# Patient Record
Sex: FEMALE | Race: WHITE
[De-identification: names, ages, dates, MRNs, and addresses within clinical notes are randomized per-mention and may not be internally consistent; named-entity substitution may affect disease eponyms.]

---

## 2017-01-31 ENCOUNTER — HOSPITAL ENCOUNTER (OUTPATIENT)
Age: 82
Discharge: HOME | End: 2017-01-31
Payer: MEDICARE

## 2017-01-31 DIAGNOSIS — R73.01: ICD-10-CM

## 2017-01-31 DIAGNOSIS — S52.532A: Primary | ICD-10-CM

## 2017-01-31 DIAGNOSIS — I10: ICD-10-CM

## 2017-01-31 DIAGNOSIS — M81.0: ICD-10-CM

## 2017-01-31 PROCEDURE — 25609 OPTX DST RD XART FX/EP SEP3+: CPT

## 2017-01-31 PROCEDURE — 0PSJ04Z REPOSITION LEFT RADIUS WITH INTERNAL FIXATION DEVICE, OPEN APPROACH: ICD-10-PCS | Performed by: ORTHOPAEDIC SURGERY

## 2017-11-17 ENCOUNTER — HOSPITAL ENCOUNTER (OUTPATIENT)
Dept: HOSPITAL 76 - DI.N | Age: 82
Discharge: HOME | End: 2017-11-17
Attending: FAMILY MEDICINE
Payer: MEDICARE

## 2017-11-17 DIAGNOSIS — Z12.31: Primary | ICD-10-CM

## 2017-11-17 PROCEDURE — 77067 SCR MAMMO BI INCL CAD: CPT

## 2017-11-20 NOTE — MAMMOGRAPHY REPORT
DIGITAL SCREENING MAMMOGRAM:  11/17/2017

 

CLINICAL INDICATION:  An 84-year-old for screening.

 

COMPARISON:  10/2016, 02/2015, 02/2014, 12/2010

 

TECHNIQUE:  Routine CC and MLO projections were obtained of the breasts. 

 

FINDINGS:  The breasts again demonstrate heterogeneously dense fibroglandular parenchyma bilaterally.
  Coarse and punctate, typically benign calcifications are present.  No suspicious masses, clustered 
microcalcifications, or regions of architectural distortion are identified.

 

IMPRESSION:  BENIGN FINDINGS.

 

RECOMMENDATION:  Routine annual screening unless otherwise clinically indicated.

 

BIRADS CATEGORY 2 - BENIGN FINDINGS.

 

STANDARD QUALIFYING STATEMENTS

 

1. This examination was reviewed with the aid of Computer-Aided Detection (CAD).

 

2. A negative or benign imaging report should not delay biopsy if clinically suspicious findings are 
present. Consider surgical consultation if warranted. More than 5% of cancers are not identified by i
maging.

 

3. Dense breasts may obscure an underlying neoplasm.

 

 

 

 

 

DD:11/20/2017 13:17:18  DT: 11/20/2017 13:44  JOB #: [id number]  EXT JOB #:[id number]

## 2018-06-05 ENCOUNTER — HOSPITAL ENCOUNTER (OUTPATIENT)
Dept: HOSPITAL 76 - LAB.WCP | Age: 83
Discharge: HOME | End: 2018-06-05
Attending: FAMILY MEDICINE
Payer: MEDICARE

## 2018-06-05 DIAGNOSIS — Z83.79: ICD-10-CM

## 2018-06-05 DIAGNOSIS — R73.01: ICD-10-CM

## 2018-06-05 DIAGNOSIS — Z00.00: Primary | ICD-10-CM

## 2018-06-05 DIAGNOSIS — I10: ICD-10-CM

## 2018-06-05 DIAGNOSIS — D64.9: ICD-10-CM

## 2018-06-05 LAB
ALBUMIN DIAFP-MCNC: 3.6 G/DL (ref 3.2–5.5)
ALBUMIN/GLOB SERPL: 1 {RATIO} (ref 1–2.2)
ALP SERPL-CCNC: 40 IU/L (ref 42–121)
ALT SERPL W P-5'-P-CCNC: 28 IU/L (ref 10–60)
ANION GAP SERPL CALCULATED.4IONS-SCNC: 5 MMOL/L (ref 6–13)
AST SERPL W P-5'-P-CCNC: 30 IU/L (ref 10–42)
BILIRUB BLD-MCNC: 1 MG/DL (ref 0.2–1)
BUN SERPL-MCNC: 13 MG/DL (ref 6–20)
CALCIUM UR-MCNC: 9.3 MG/DL (ref 8.5–10.3)
CHLORIDE SERPL-SCNC: 107 MMOL/L (ref 101–111)
CHOLEST SERPL-MCNC: 158 MG/DL
CO2 SERPL-SCNC: 27 MMOL/L (ref 21–32)
CREAT SERPLBLD-SCNC: 0.6 MG/DL (ref 0.4–1)
EST. AVERAGE GLUCOSE BLD GHB EST-MCNC: 108 MG/DL (ref 70–100)
GFRSERPLBLD MDRD-ARVRAT: 95 ML/MIN/{1.73_M2} (ref 89–?)
GLOBULIN SER-MCNC: 3.7 G/DL (ref 2.1–4.2)
GLUCOSE SERPL-MCNC: 105 MG/DL (ref 70–100)
HB2 TOTAL: 13.6 G/DL
HBA1C BLD-MCNC: 0.48 G/DL
HDLC SERPL-MCNC: 64 MG/DL
HDLC SERPL: 2.5 {RATIO} (ref ?–4.4)
HEMOGLOBIN A1C %: 5.4 % (ref 4.6–6.2)
LDLC SERPL CALC-MCNC: 77 MG/DL
LDLC/HDLC SERPL: 1.2 {RATIO} (ref ?–4.4)
PROT SPEC-MCNC: 7.3 G/DL (ref 6.7–8.2)
SODIUM SERPLBLD-SCNC: 139 MMOL/L (ref 135–145)
VLDLC SERPL-SCNC: 17 MG/DL

## 2018-06-05 PROCEDURE — 83036 HEMOGLOBIN GLYCOSYLATED A1C: CPT

## 2018-06-05 PROCEDURE — 80061 LIPID PANEL: CPT

## 2018-06-05 PROCEDURE — 80053 COMPREHEN METABOLIC PANEL: CPT

## 2018-06-05 PROCEDURE — 83721 ASSAY OF BLOOD LIPOPROTEIN: CPT

## 2018-06-05 PROCEDURE — 36415 COLL VENOUS BLD VENIPUNCTURE: CPT

## 2018-12-20 ENCOUNTER — HOSPITAL ENCOUNTER (OUTPATIENT)
Dept: HOSPITAL 76 - DI.N | Age: 83
Discharge: HOME | End: 2018-12-20
Attending: GENERAL ACUTE CARE HOSPITAL
Payer: MEDICARE

## 2018-12-20 DIAGNOSIS — Z12.31: Primary | ICD-10-CM

## 2018-12-20 PROCEDURE — 77067 SCR MAMMO BI INCL CAD: CPT

## 2018-12-21 NOTE — MAMMOGRAPHY REPORT
Reason:  SCREENING MAMMO

Procedure Date:  12/20/2018   

Accession Number:  [id number] / [id number]                    

Procedure:  MGN - Screening Mammo Dig Bilat CPT Code:  

 

FULL RESULT:

 

 

EXAM: Screening Mammo Dig Bilat

 

DATE: 12/20/2018 10:47 AM

 

CLINICAL HISTORY: Screening. No reported personal or family history of 

breast cancer.

 

TECHNIQUE: Bilateral CC and MLO views were obtained.

 

COMPARISON: 11/17/2017 through 2/10/2014

 

FINDINGS:

The breasts demonstrate heterogeneously dense fibroglandular parenchyma 

bilaterally.

 

Bilateral breasts: There are no suspicious masses, calcifications or 

areas of distortion.

 

IMPRESSION: Negative examination

 

RECOMMENDATION: Routine annual screening unless otherwise clinically 

indicated.

 

BI-RADS CATEGORY 1: Negative

 

STANDARD QUALIFYING STATEMENTS:

1.  This examination was reviewed with the aid of Computer-Aided 

Detection (CAD).

2.  A negative or benign  imaging report should not preclude biopsy if 

clinically suspicious findings are present.

3.  Dense breasts may obscure an underlying neoplasm.

4. This examination was reviewed without the aid of 3D breast imaging 

(tomosynthesis).

## 2020-01-21 ENCOUNTER — HOSPITAL ENCOUNTER (OUTPATIENT)
Dept: HOSPITAL 76 - DI.N | Age: 85
Discharge: HOME | End: 2020-01-21
Attending: GENERAL ACUTE CARE HOSPITAL
Payer: MEDICARE

## 2020-01-21 DIAGNOSIS — Z12.31: Primary | ICD-10-CM

## 2020-01-21 PROCEDURE — 77067 SCR MAMMO BI INCL CAD: CPT

## 2020-01-21 NOTE — MAMMOGRAPHY REPORT
Reason:  ROUTINE MAMMO

Procedure Date:  01/21/2020   

Accession Number:  [id number] / [id number]                    

Procedure:  MGN - Screening Mammo Dig Bilat CPT Code:  

 

***Final Report***

 

 

FULL RESULT:

 

 

EXAM: Screening Mammo Dig Bilat

 

DATE: 1/21/2020 10:22 AM

 

CLINICAL HISTORY:  Routine screening

 

TECHNIQUE: (B) - Bilateral  CC and MLO views were obtained.

 

COMPARISON: 12/20/2018, 11/17/2017, 10/11/2016, 2/20/2015, 2/10/2014, 

12/13/2010.

 

PARENCHYMAL PATTERN: (VD) - The breasts demonstrate extremely dense 

parenchyma bilaterally, limiting the sensitivity of mammography.

 

FINDINGS:

No significant interval change. There are no suspicious masses, 

calcifications, or areas of distortion. Benign-appearing predominantly 

vascular calcifications are stable.

 

IMPRESSION: Negative examination. BI-RADS category 1.

 

RECOMMENDATION: (ANNUAL)  - Recommend routine annual screening 

mammography.

 

BI-RADS CATEGORY: (1) - Negative.

 

STANDARD QUALIFYING STATEMENTS:

1.  This examination was not reviewed with the aid of Computer-Aided 

Detection (CAD).

2.  A negative or benign  imaging report should not preclude biopsy if 

clinically suspicious findings are present.

3.  Dense breasts may obscure an underlying neoplasm.

4. This examination was reviewed without the aid of 3D breast imaging 

(tomosynthesis).

## 2021-04-19 ENCOUNTER — HOSPITAL ENCOUNTER (OUTPATIENT)
Dept: HOSPITAL 76 - LAB.WCP | Age: 86
Discharge: HOME | End: 2021-04-19
Attending: FAMILY MEDICINE
Payer: MEDICARE

## 2021-04-19 DIAGNOSIS — Z00.00: Primary | ICD-10-CM

## 2021-04-19 DIAGNOSIS — I10: ICD-10-CM

## 2021-04-19 DIAGNOSIS — R73.01: ICD-10-CM

## 2021-04-19 DIAGNOSIS — D64.9: ICD-10-CM

## 2021-04-19 LAB
ALBUMIN DIAFP-MCNC: 3.9 G/DL (ref 3.2–5.5)
ALBUMIN/GLOB SERPL: 1.1 {RATIO} (ref 1–2.2)
ALP SERPL-CCNC: 50 IU/L (ref 42–121)
ALT SERPL W P-5'-P-CCNC: 42 IU/L (ref 10–60)
ANION GAP SERPL CALCULATED.4IONS-SCNC: 7 MMOL/L (ref 6–13)
AST SERPL W P-5'-P-CCNC: 43 IU/L (ref 10–42)
BASOPHILS NFR BLD AUTO: 0 10^3/UL (ref 0–0.1)
BASOPHILS NFR BLD AUTO: 0.6 %
BILIRUB BLD-MCNC: 0.6 MG/DL (ref 0.2–1)
BUN SERPL-MCNC: 13 MG/DL (ref 6–20)
CALCIUM UR-MCNC: 9.6 MG/DL (ref 8.5–10.3)
CHLORIDE SERPL-SCNC: 109 MMOL/L (ref 101–111)
CHOLEST SERPL-MCNC: 172 MG/DL
CO2 SERPL-SCNC: 28 MMOL/L (ref 21–32)
CREAT SERPLBLD-SCNC: 0.7 MG/DL (ref 0.4–1)
EOSINOPHIL # BLD AUTO: 0.1 10^3/UL (ref 0–0.7)
EOSINOPHIL NFR BLD AUTO: 1.9 %
ERYTHROCYTE [DISTWIDTH] IN BLOOD BY AUTOMATED COUNT: 13.7 % (ref 12–15)
EST. AVERAGE GLUCOSE BLD GHB EST-MCNC: 111 MG/DL (ref 70–100)
GFRSERPLBLD MDRD-ARVRAT: 79 ML/MIN/{1.73_M2} (ref 89–?)
GLOBULIN SER-MCNC: 3.7 G/DL (ref 2.1–4.2)
GLUCOSE SERPL-MCNC: 105 MG/DL (ref 70–100)
HBA1C MFR BLD HPLC: 5.5 % (ref 4.27–6.07)
HCT VFR BLD AUTO: 41.3 % (ref 37–47)
HDLC SERPL-MCNC: 71 MG/DL
HDLC SERPL: 2.4 {RATIO} (ref ?–4.4)
HGB UR QL STRIP: 13.3 G/DL (ref 12–16)
LDLC SERPL CALC-MCNC: 85 MG/DL
LDLC/HDLC SERPL: 1.2 {RATIO} (ref ?–4.4)
LYMPHOCYTES # SPEC AUTO: 1.7 10^3/UL (ref 1.5–3.5)
LYMPHOCYTES NFR BLD AUTO: 27.4 %
MCH RBC QN AUTO: 32.8 PG (ref 27–31)
MCHC RBC AUTO-ENTMCNC: 32.2 G/DL (ref 32–36)
MCV RBC AUTO: 101.7 FL (ref 81–99)
MONOCYTES # BLD AUTO: 0.5 10^3/UL (ref 0–1)
MONOCYTES NFR BLD AUTO: 7.9 %
NEUTROPHILS # BLD AUTO: 3.8 10^3/UL (ref 1.5–6.6)
NEUTROPHILS # SNV AUTO: 6.2 X10^3/UL (ref 4.8–10.8)
NEUTROPHILS NFR BLD AUTO: 61.9 %
NRBC # BLD AUTO: 0 /100WBC
NRBC # BLD AUTO: 0 X10^3/UL
PDW BLD AUTO: 10.4 FL (ref 7.9–10.8)
PLATELET # BLD: 171 10^3/UL (ref 130–450)
POTASSIUM SERPL-SCNC: 3.6 MMOL/L (ref 3.5–5)
PROT SPEC-MCNC: 7.6 G/DL (ref 6.7–8.2)
RBC MAR: 4.06 10^6/UL (ref 4.2–5.4)
SODIUM SERPLBLD-SCNC: 144 MMOL/L (ref 135–145)
TRIGL P FAST SERPL-MCNC: 82 MG/DL
VLDLC SERPL-SCNC: 16 MG/DL

## 2021-04-19 PROCEDURE — 36415 COLL VENOUS BLD VENIPUNCTURE: CPT

## 2021-04-19 PROCEDURE — 80061 LIPID PANEL: CPT

## 2021-04-19 PROCEDURE — 85025 COMPLETE CBC W/AUTO DIFF WBC: CPT

## 2021-04-19 PROCEDURE — 83721 ASSAY OF BLOOD LIPOPROTEIN: CPT

## 2021-04-19 PROCEDURE — 83036 HEMOGLOBIN GLYCOSYLATED A1C: CPT

## 2021-04-19 PROCEDURE — 80053 COMPREHEN METABOLIC PANEL: CPT

## 2021-10-26 ENCOUNTER — HOSPITAL ENCOUNTER (OUTPATIENT)
Dept: HOSPITAL 76 - DI.N | Age: 86
End: 2021-10-26
Attending: PHYSICIAN ASSISTANT
Payer: MEDICARE

## 2021-10-26 DIAGNOSIS — K59.00: Primary | ICD-10-CM

## 2021-11-04 NOTE — XRAY REPORT
PROCEDURE:  Abdomen 2 View X-Ray

 

INDICATIONS:  CONSTIPATION

 

TECHNIQUE:  1 view of the abdomen were acquired.  Images became available for interpretation on 11/3/
2021.

 

COMPARISON:  None

 

FINDINGS:  

Surgical changes and devices:  None.  

 

Bowel:  No pneumoperitoneum.  The bowel gas pattern demonstrates significant colonic stool.

 

Soft tissues:  No masses; visualized solid organ contours appear normal in size.  No suspicious abdom
inal calcifications.  

 

Bones:  No suspicious bony abnormalities.  Mild leftward scoliotic curvature.

 

IMPRESSION:  Significant colonic stool consistent with constipation. No obstruction.

 

Reviewed by: Ilana Chavez MD on 11/4/2021 12:05 AM PDT

Approved by: Ilana Chavez MD on 11/4/2021 12:05 AM PDT

 

 

Station ID:  [id number]

## 2022-01-18 ENCOUNTER — HOSPITAL ENCOUNTER (OUTPATIENT)
Dept: HOSPITAL 76 - LAB.N | Age: 87
Discharge: HOME | End: 2022-01-18
Attending: FAMILY MEDICINE
Payer: MEDICARE

## 2022-01-18 DIAGNOSIS — R63.4: ICD-10-CM

## 2022-01-18 DIAGNOSIS — R19.4: Primary | ICD-10-CM

## 2022-01-18 LAB
ALBUMIN DIAFP-MCNC: 3.6 G/DL (ref 3.2–5.5)
ALBUMIN/GLOB SERPL: 1 {RATIO} (ref 1–2.2)
ALP SERPL-CCNC: 42 IU/L (ref 42–121)
ALT SERPL W P-5'-P-CCNC: 28 IU/L (ref 10–60)
ANION GAP SERPL CALCULATED.4IONS-SCNC: 7 MMOL/L (ref 6–13)
AST SERPL W P-5'-P-CCNC: 36 IU/L (ref 10–42)
BASOPHILS NFR BLD AUTO: 0 10^3/UL (ref 0–0.1)
BASOPHILS NFR BLD AUTO: 0.6 %
BILIRUB BLD-MCNC: 0.8 MG/DL (ref 0.2–1)
BUN SERPL-MCNC: 10 MG/DL (ref 6–20)
CALCIUM UR-MCNC: 9.2 MG/DL (ref 8.5–10.3)
CHLORIDE SERPL-SCNC: 105 MMOL/L (ref 101–111)
CO2 SERPL-SCNC: 28 MMOL/L (ref 21–32)
CREAT SERPLBLD-SCNC: 0.8 MG/DL (ref 0.4–1)
EOSINOPHIL # BLD AUTO: 0.1 10^3/UL (ref 0–0.7)
EOSINOPHIL NFR BLD AUTO: 1.9 %
ERYTHROCYTE [DISTWIDTH] IN BLOOD BY AUTOMATED COUNT: 14.2 % (ref 12–15)
GFRSERPLBLD MDRD-ARVRAT: 68 ML/MIN/{1.73_M2} (ref 89–?)
GLOBULIN SER-MCNC: 3.6 G/DL (ref 2.1–4.2)
GLUCOSE SERPL-MCNC: 107 MG/DL (ref 70–100)
HCT VFR BLD AUTO: 41.2 % (ref 37–47)
HGB UR QL STRIP: 13.1 G/DL (ref 12–16)
LYMPHOCYTES # SPEC AUTO: 1.3 10^3/UL (ref 1.5–3.5)
LYMPHOCYTES NFR BLD AUTO: 20.7 %
MCH RBC QN AUTO: 33.2 PG (ref 27–31)
MCHC RBC AUTO-ENTMCNC: 31.8 G/DL (ref 32–36)
MCV RBC AUTO: 104.6 FL (ref 81–99)
MONOCYTES # BLD AUTO: 0.5 10^3/UL (ref 0–1)
MONOCYTES NFR BLD AUTO: 8.4 %
NEUTROPHILS # BLD AUTO: 4.3 10^3/UL (ref 1.5–6.6)
NEUTROPHILS # SNV AUTO: 6.3 X10^3/UL (ref 4.8–10.8)
NEUTROPHILS NFR BLD AUTO: 68.2 %
NRBC # BLD AUTO: 0 /100WBC
NRBC # BLD AUTO: 0 X10^3/UL
PDW BLD AUTO: 10.8 FL (ref 7.9–10.8)
PLATELET # BLD: 148 10^3/UL (ref 130–450)
POTASSIUM SERPL-SCNC: 3.9 MMOL/L (ref 3.5–5)
PROT SPEC-MCNC: 7.2 G/DL (ref 6.7–8.2)
RBC MAR: 3.94 10^6/UL (ref 4.2–5.4)
SODIUM SERPLBLD-SCNC: 140 MMOL/L (ref 135–145)
T3FREE SERPL-MCNC: 2.94 PG/ML (ref 2.5–3.9)
T4 FREE SERPL-MCNC: 0.89 NG/DL (ref 0.58–1.64)
TSH SERPL-ACNC: 2.28 UIU/ML (ref 0.34–5.6)

## 2022-01-18 PROCEDURE — 85025 COMPLETE CBC W/AUTO DIFF WBC: CPT

## 2022-01-18 PROCEDURE — 84439 ASSAY OF FREE THYROXINE: CPT

## 2022-01-18 PROCEDURE — 84481 FREE ASSAY (FT-3): CPT

## 2022-01-18 PROCEDURE — 80053 COMPREHEN METABOLIC PANEL: CPT

## 2022-01-18 PROCEDURE — 36415 COLL VENOUS BLD VENIPUNCTURE: CPT

## 2022-01-18 PROCEDURE — 84443 ASSAY THYROID STIM HORMONE: CPT

## 2022-07-25 ENCOUNTER — HOSPITAL ENCOUNTER (EMERGENCY)
Dept: HOSPITAL 76 - ED | Age: 87
Discharge: HOME | End: 2022-07-25
Payer: MEDICARE

## 2022-07-25 VITALS — DIASTOLIC BLOOD PRESSURE: 83 MMHG | SYSTOLIC BLOOD PRESSURE: 145 MMHG

## 2022-07-25 DIAGNOSIS — K92.1: ICD-10-CM

## 2022-07-25 DIAGNOSIS — K52.9: Primary | ICD-10-CM

## 2022-07-25 DIAGNOSIS — I10: ICD-10-CM

## 2022-07-25 LAB
ALBUMIN DIAFP-MCNC: 3.7 G/DL (ref 3.2–5.5)
ALBUMIN/GLOB SERPL: 1 {RATIO} (ref 1–2.2)
ALP SERPL-CCNC: 39 IU/L (ref 42–121)
ALT SERPL W P-5'-P-CCNC: 31 IU/L (ref 10–60)
ANION GAP SERPL CALCULATED.4IONS-SCNC: 8 MMOL/L (ref 6–13)
AST SERPL W P-5'-P-CCNC: 38 IU/L (ref 10–42)
BASOPHILS NFR BLD AUTO: 0 10^3/UL (ref 0–0.1)
BASOPHILS NFR BLD AUTO: 0.3 %
BILIRUB BLD-MCNC: 0.7 MG/DL (ref 0.2–1)
BUN SERPL-MCNC: 12 MG/DL (ref 6–20)
CALCIUM UR-MCNC: 9.1 MG/DL (ref 8.5–10.3)
CHLORIDE SERPL-SCNC: 105 MMOL/L (ref 101–111)
CO2 SERPL-SCNC: 28 MMOL/L (ref 21–32)
CREAT SERPLBLD-SCNC: 0.7 MG/DL (ref 0.4–1)
EOSINOPHIL # BLD AUTO: 0 10^3/UL (ref 0–0.7)
EOSINOPHIL NFR BLD AUTO: 0 %
ERYTHROCYTE [DISTWIDTH] IN BLOOD BY AUTOMATED COUNT: 13.3 % (ref 12–15)
GFRSERPLBLD MDRD-ARVRAT: 79 ML/MIN/{1.73_M2} (ref 89–?)
GLOBULIN SER-MCNC: 3.6 G/DL (ref 2.1–4.2)
GLUCOSE SERPL-MCNC: 142 MG/DL (ref 70–100)
HCT VFR BLD AUTO: 39.6 % (ref 37–47)
HGB UR QL STRIP: 13.3 G/DL (ref 12–16)
INR PPP: 1.1 (ref 0.8–1.2)
LIPASE SERPL-CCNC: 49 U/L (ref 22–51)
LYMPHOCYTES # SPEC AUTO: 1.3 10^3/UL (ref 1.5–3.5)
LYMPHOCYTES NFR BLD AUTO: 12 %
MCH RBC QN AUTO: 33.4 PG (ref 27–31)
MCHC RBC AUTO-ENTMCNC: 33.6 G/DL (ref 32–36)
MCV RBC AUTO: 99.5 FL (ref 81–99)
MONOCYTES # BLD AUTO: 0.6 10^3/UL (ref 0–1)
MONOCYTES NFR BLD AUTO: 5.9 %
NEUTROPHILS # BLD AUTO: 8.8 10^3/UL (ref 1.5–6.6)
NEUTROPHILS # SNV AUTO: 10.9 X10^3/UL (ref 4.8–10.8)
NEUTROPHILS NFR BLD AUTO: 81.2 %
NRBC # BLD AUTO: 0 /100WBC
NRBC # BLD AUTO: 0 X10^3/UL
PDW BLD AUTO: 9.5 FL (ref 7.9–10.8)
PLATELET # BLD: 164 10^3/UL (ref 130–450)
POTASSIUM SERPL-SCNC: 3.7 MMOL/L (ref 3.5–5)
PROT SPEC-MCNC: 7.3 G/DL (ref 6.7–8.2)
PROTHROM ACT/NOR PPP: 12.1 SECS (ref 9.9–12.6)
RBC MAR: 3.98 10^6/UL (ref 4.2–5.4)
SODIUM SERPLBLD-SCNC: 141 MMOL/L (ref 135–145)

## 2022-07-25 PROCEDURE — 86900 BLOOD TYPING SEROLOGIC ABO: CPT

## 2022-07-25 PROCEDURE — 85610 PROTHROMBIN TIME: CPT

## 2022-07-25 PROCEDURE — 36415 COLL VENOUS BLD VENIPUNCTURE: CPT

## 2022-07-25 PROCEDURE — 85025 COMPLETE CBC W/AUTO DIFF WBC: CPT

## 2022-07-25 PROCEDURE — 83690 ASSAY OF LIPASE: CPT

## 2022-07-25 PROCEDURE — 86850 RBC ANTIBODY SCREEN: CPT

## 2022-07-25 PROCEDURE — 99284 EMERGENCY DEPT VISIT MOD MDM: CPT

## 2022-07-25 PROCEDURE — 80053 COMPREHEN METABOLIC PANEL: CPT

## 2022-07-25 PROCEDURE — 86901 BLOOD TYPING SEROLOGIC RH(D): CPT

## 2022-07-25 PROCEDURE — 74176 CT ABD & PELVIS W/O CONTRAST: CPT

## 2022-07-25 NOTE — CT REPORT
PROCEDURE:  Abdomen/Pelvis WO

 

INDICATIONS:  lower abd cramps/pain and BRBPR

 

TECHNIQUE:  

Noncontrast 5 mm thick sections acquired from the diaphragms to the symphysis.  5 mm coronal and sagi
ttal reformats were then performed.  For radiation dose reduction, the following was used:  automated
 exposure control, adjustment of mA and/or kV according to patient size.

 

COMPARISON:  Abdominal radiographs 10/26/2021.

 

FINDINGS:  

Image quality:  Excellent. Evaluation of the solid parenchymal organs is limited without IV contrast.
 

 

ABDOMEN:  

Lung bases: No pleural effusion. Mild fibrosis. Heart size is normal.  

 

Solid organs:  Liver and spleen are normal in size.  Gallbladder is unremarkable. Hypodensity in the 
body of the pancreas measuring 0.8 cm. Hypodensity in the neck of the pancreas measuring 1.5 x 0.6 cm
. No peripancreatic fluid collection. No adrenal nodules.  Kidneys are normal in size, without hydron
ephrosis or nephrolithiasis. Small low-density left renal cyst. 

 

Peritoneum and bowel: There is thickening and stranding surrounding extending from the sigmoid colon 
to the mid transverse colon. This is consistent with a colitis. There are a few colonic diverticuli. 
No small bowel obstruction. No pneumatosis intestinalis. No portal venous gas. No pneumoperitoneum. N
o ascites.

 

Nodes and vessels:  No retroperitoneal or mesenteric adenopathy by size criteria.  Aorta and inferior
 vena cava are normal in caliber.  

 

Miscellaneous:  No ventral hernias.  

 

 

PELVIS:  

Genitourinary:  Bladder wall thickness is normal. Anteverted uterus. 

 

Miscellaneous:  No inguinal hernias or adenopathy.  

 

Bones:  No suspicious bony lesions. Scoliosis.  No vertebral body compression fractures.  

 

IMPRESSION:  

1. Left colon long segment colitis. This could be due to infectious/inflammatory etiology. Ischemic c
olitis is felt to be less likely. Follow-up colonoscopy would be helpful if not recently performed.

 

2. Hypodense foci in the body and neck of the pancreas. Largest measuring 1.5 x 0.6 cm. These could r
epresent IPMN. This can be further evaluated with MRI of the pancreas if clinically indicated in this
 older patient.

 

 

Results were communicated to Dr. Twan Ham at 7/25/2022 5:16 PM PDT.

 

Reviewed by: Madan Wiley MD on 7/25/2022 5:21 PM PDT

Approved by: Madan Wiley MD on 7/25/2022 5:21 PM PDT

 

 

Station ID:  [id number]

## 2022-07-25 NOTE — ED PHYSICIAN DOCUMENTATION
PD HPI GI BLEED





- Stated complaint


Stated Complaint: FEMALE GI





- Chief complaint


Chief Complaint: Abd Pain





- History obtained from


History obtained from: Patient





- History of Present Illness


Timing - onset: Today (onset at 4 am of lower abd cramping associated with loose

stool and red blood.)


Timing - duration: Hours


Timing - details: Abrupt onset, Still present


Associated symptoms: BRBPR, Abdominal pain (lower cramping).  No: Vomiting, 

Coffee ground emesis, Black/tarry stool, Chest pain


Contributing factors: No: Sick contact, Bad food, Anticoagulated


Improved by: BM (lower cramping before rectal movement of some blood and mucous.

not consistent pain.)


Worsened by: No: Moving, Position, Palpation


Similar symptoms before: Has not had sx before


Recently seen: Clinic (went to Walk In and was referred to ER for eval.)





Review of Systems


Constitutional: denies: Fever, Chills


Nose: denies: Rhinorrhea / runny nose, Congestion


Throat: denies: Sore throat


Respiratory: denies: Cough





PD PAST MEDICAL HISTORY





- Past Medical History


Cardiovascular: Hypertension


Respiratory: None


Endocrine/Autoimmune: None


GI: None


: None


HEENT: Glaucoma, Chronic hearing loss


Psych: Depression


Musculoskeletal: Osteoarthritis


Derm: None





- Past Surgical History


Past Surgical History: Yes


General: Colonoscopy


HEENT: Cataracts, Other





- Present Medications


Home Medications: 


                                Ambulatory Orders











 Medication  Instructions  Recorded  Confirmed


 


Losartan [Cozaar] 50 mg PO DAILY 02/15/15 02/15/15


 


Raloxifene [Evista] 60 mg PO DAILY 02/15/15 01/31/17


 


Ondansetron Odt [Zofran Odt] 4 mg TL Q6H PRN #10 tablet 02/16/15 


 


Brinzolamide 1% Ophth Drops [Azopt 1 drops EACHEYE TID 01/31/17 01/31/17





1% Ophth Drops]   


 


Felodipine [Felodipine ER] 10 mg ORAL DAILY 01/31/17 01/31/17


 


HYDROcod/ACETAM 5/325 [Norco 5/325] 1 tab ORAL Q6HR PRN 01/31/17 01/31/17


 


Latanoprost 0.005% Ophth Drops 1 drops EACHEYE DAILY 01/31/17 01/31/17





[Xalatan Ophth Drops]   


 


Propylene Glycol/Peg 400 [Systane 1 drops EACHEYE TID 01/31/17 01/31/17





Ultra 0.4-0.3% Eye Drp]   


 


Amox/Clav 875/125 [Augmentin] 1 each PO Q12H #10 tablet 07/25/22 


 


HYDROcod/ACETAM 5/325 [Norco 5/325] 1 ea PO Q6H PRN #12 tablet 07/25/22 


 


Naproxen 250 mg PO BID 5 Days #10 tablet 07/25/22 














- Allergies


Allergies/Adverse Reactions: 


                                    Allergies











Allergy/AdvReac Type Severity Reaction Status Date / Time


 


No Known Drug Allergies Allergy   Verified 07/25/22 13:26














- Social History


Does the pt smoke?: No


Smoking Status: Never smoker





- Immunizations


Immunizations are current?: No





- POLST


Patient has POLST: No





PD ED PE NORMAL





- Vitals


Vital signs reviewed: Yes





- General


General: Alert and oriented X 3, No acute distress, Well developed/nourished





- Neck


Neck: Supple, no meningeal sign, No adenopathy





- Cardiac


Cardiac: RRR, No murmur





- Respiratory


Respiratory: Clear bilaterally





- Abdomen


Abdomen: Normal bowel sounds, Soft, Non distended, No organomegaly, Other 

(minimal tenderness lower abd more to the left. No guarding nor percussion 

tender. Anoscopy shows small internal hemorrhoid without bleeding. Distal colon 

inflammed with speckled blood spots. Has vascular pink color to wall. )





Results





- Vitals


Vitals: 


                               Vital Signs - 24 hr











  07/25/22 07/25/22 07/25/22





  13:19 15:37 16:54


 


Temperature 36.3 C L  


 


Heart Rate 83 75 77


 


Respiratory 18 14 14





Rate   


 


Blood Pressure 137/79 H 156/80 H 151/75 H


 


O2 Saturation 97 100 100














  07/25/22





  18:01


 


Temperature 


 


Heart Rate 74


 


Respiratory 18





Rate 


 


Blood Pressure 145/83 H


 


O2 Saturation 100








                                     Oxygen











O2 Source                      Room air

















- Labs


Labs: 


                                Laboratory Tests











  07/25/22 07/25/22 07/25/22





  13:31 13:31 13:31


 


WBC   10.9 H 


 


RBC   3.98 L 


 


Hgb   13.3 


 


Hct   39.6 


 


MCV   99.5 H 


 


MCH   33.4 H 


 


MCHC   33.6 


 


RDW   13.3 


 


Plt Count   164 


 


MPV   9.5 


 


Neut # (Auto)   8.8 H 


 


Lymph # (Auto)   1.3 L 


 


Mono # (Auto)   0.6 


 


Eos # (Auto)   0.0 


 


Baso # (Auto)   0.0 


 


Absolute Nucleated RBC   0.00 


 


Nucleated RBC %   0.0 


 


PT    12.1


 


INR    1.1


 


Sodium   


 


Potassium   


 


Chloride   


 


Carbon Dioxide   


 


Anion Gap   


 


BUN   


 


Creatinine   


 


Estimated GFR (MDRD)   


 


Glucose   


 


Calcium   


 


Total Bilirubin   


 


AST   


 


ALT   


 


Alkaline Phosphatase   


 


Total Protein   


 


Albumin   


 


Globulin   


 


Albumin/Globulin Ratio   


 


Lipase   


 


Blood Type  O POSITIVE  


 


Antibody Screen  NEGATIVE  














  07/25/22





  13:31


 


WBC 


 


RBC 


 


Hgb 


 


Hct 


 


MCV 


 


MCH 


 


MCHC 


 


RDW 


 


Plt Count 


 


MPV 


 


Neut # (Auto) 


 


Lymph # (Auto) 


 


Mono # (Auto) 


 


Eos # (Auto) 


 


Baso # (Auto) 


 


Absolute Nucleated RBC 


 


Nucleated RBC % 


 


PT 


 


INR 


 


Sodium  141


 


Potassium  3.7


 


Chloride  105


 


Carbon Dioxide  28


 


Anion Gap  8.0


 


BUN  12


 


Creatinine  0.7


 


Estimated GFR (MDRD)  79 L


 


Glucose  142 H


 


Calcium  9.1


 


Total Bilirubin  0.7


 


AST  38


 


ALT  31


 


Alkaline Phosphatase  39 L


 


Total Protein  7.3


 


Albumin  3.7


 


Globulin  3.6


 


Albumin/Globulin Ratio  1.0


 


Lipase  49


 


Blood Type 


 


Antibody Screen 














- Rads (name of study)


  ** abd/pelvic CT


Radiology: Prelim report reviewed (IMPRESSION: ), See rad report





PD MEDICAL DECISION MAKING





- ED course


Complexity details: reviewed results (IMPRESSION: ), re-evaluated patient 

(stable vitals. Good blood count. has had small red blood output here in ER, but

only about 5-10 ml. Anoscopy shows inflammed distal colon with vascular color. 

Small internal hemorrhoid but does not appear bleeding. ), considered 

differential (having lower abd pain and red blood per rectum. Consider colitis, 

diverticulitis, versus vascular like polyp/tumor with bleeding. ), d/w patient


ED course: 





IMPRESSION: 


1. Left colon long segment colitis. This could be due to infectious/inflammatory

etiology. Ischemic


colitis is felt to be less likely. Follow-up colonoscopy would be helpful if not

recently performed. 





2. Hypodense foci in the body and neck of the pancreas. Largest measuring 1.5 x 

0.6 cm. These could


represent IPMN. This can be further evaluated with MRI of the pancreas if 

clinically indicated in 


this older patient. 








Departure





- Departure


Disposition: 01 Home, Self Care


Clinical Impression: 


 Hematochezia, Colitis





Condition: Stable


Record reviewed to determine appropriate education?: Yes


Prescriptions: 


Amox/Clav 875/125 [Augmentin] 1 each PO Q12H #10 tablet


Naproxen 250 mg PO BID 5 Days #10 tablet


HYDROcod/ACETAM 5/325 [Norco 5/325] 1 ea PO Q6H PRN #12 tablet


 PRN Reason: Pain


Comments: 


IMPRESSION: 


1. Left colon long segment colitis. This could be due to infectious/inflammatory

etiology. Ischemic


colitis is felt to be less likely. Follow-up colonoscopy would be helpful if not

recently performed. 





2. Hypodense foci in the body and neck of the pancreas. Largest measuring 1.5 x 

0.6 cm. These could


represent IPMN. This can be further evaluated with MRI of the pancreas if 

clinically indicated in 


this older patient. 





Your CT scan shows an area of colitis.  We will treat this with anti-

inflammatories and antibiotics.  This would be the obvious source of bleeding 

and typically is more diffuse weeping of blood as opposed to a particular blood 

vessel.





Your blood count and vital signs are good at this time.  You would expect some 

bleeding still with bowel movements while the colitis is improving.





Return to the ER if you have significantly worsening bleeding or in particular 

for feeling generally weak and lightheaded.  Otherwise it would be good to 

recheck in a couple of days with either your primary care, the walk-in clinic, 

or back in the ER.





Tylenol if needed for pains or add hydrocodone if needed for worse pain.





On the CT scan, the radiologist also makes note of a unusual small appearance in

the pancreas.  They suggest this can be further evaluated with MRI of the 

pancreas in the near future to ensure its not a tumor type lesion.  This would 

need to be arranged by your primary care on an outpatient basis.





I transmitted your prescriptions to your pharmacy.





I am prescribing a short course of narcotic pain medication for you.  These are 

potentially dangerous and addictive medications that should be used carefully.


These medications may constipate you.  Take an over-the-counter stool softener 

such as docusate twice daily with plenty of water while taking these 

medications.  If you go 24 hours without a bowel movement, take over-the-counter

MiraLAX, per package instructions.


Do not drink or drive while taking these medications.


If you received narcotic or sedating medications while in the emergency 

department do not drive for 24 hours.  Store this medication in a safe, secure 

place and out of reach of children.


It is a violation of federal law to give or sell this medication to another 

person or to use in a manner other than prescribed.


The ED will not refill narcotic prescriptions, including prescriptions lost or 

stolen.  You can dispose of unwanted medications at the 's office 

or at several pharmacies such as [facility].





Discharge Date/Time: 07/25/22 18:03

## 2022-07-27 ENCOUNTER — HOSPITAL ENCOUNTER (OUTPATIENT)
Dept: HOSPITAL 76 - LAB.N | Age: 87
Discharge: HOME | End: 2022-07-27
Attending: PHYSICIAN ASSISTANT
Payer: MEDICARE

## 2022-07-27 DIAGNOSIS — K86.9: ICD-10-CM

## 2022-07-27 DIAGNOSIS — K92.2: Primary | ICD-10-CM

## 2022-07-27 LAB
ALBUMIN DIAFP-MCNC: 3.4 G/DL (ref 3.2–5.5)
ALBUMIN/GLOB SERPL: 1 {RATIO} (ref 1–2.2)
ALP SERPL-CCNC: 44 IU/L (ref 42–121)
ALT SERPL W P-5'-P-CCNC: 19 IU/L (ref 10–60)
ANION GAP SERPL CALCULATED.4IONS-SCNC: 7 MMOL/L (ref 6–13)
AST SERPL W P-5'-P-CCNC: 23 IU/L (ref 10–42)
BASOPHILS NFR BLD AUTO: 0 10^3/UL (ref 0–0.1)
BASOPHILS NFR BLD AUTO: 0.2 %
BILIRUB BLD-MCNC: 0.8 MG/DL (ref 0.2–1)
BUN SERPL-MCNC: 17 MG/DL (ref 6–20)
CALCIUM UR-MCNC: 8.9 MG/DL (ref 8.5–10.3)
CHLORIDE SERPL-SCNC: 102 MMOL/L (ref 101–111)
CO2 SERPL-SCNC: 27 MMOL/L (ref 21–32)
CREAT SERPLBLD-SCNC: 1.1 MG/DL (ref 0.4–1)
EOSINOPHIL # BLD AUTO: 0.2 10^3/UL (ref 0–0.7)
EOSINOPHIL NFR BLD AUTO: 1.1 %
ERYTHROCYTE [DISTWIDTH] IN BLOOD BY AUTOMATED COUNT: 13.2 % (ref 12–15)
GFRSERPLBLD MDRD-ARVRAT: 47 ML/MIN/{1.73_M2} (ref 89–?)
GLOBULIN SER-MCNC: 3.4 G/DL (ref 2.1–4.2)
GLUCOSE SERPL-MCNC: 113 MG/DL (ref 70–100)
HCT VFR BLD AUTO: 36.6 % (ref 37–47)
HGB UR QL STRIP: 12.4 G/DL (ref 12–16)
LYMPHOCYTES # SPEC AUTO: 2.1 10^3/UL (ref 1.5–3.5)
LYMPHOCYTES NFR BLD AUTO: 13 %
MCH RBC QN AUTO: 34 PG (ref 27–31)
MCHC RBC AUTO-ENTMCNC: 33.9 G/DL (ref 32–36)
MCV RBC AUTO: 100.3 FL (ref 81–99)
MONOCYTES # BLD AUTO: 0.9 10^3/UL (ref 0–1)
MONOCYTES NFR BLD AUTO: 5.6 %
NEUTROPHILS # BLD AUTO: 13 10^3/UL (ref 1.5–6.6)
NEUTROPHILS # SNV AUTO: 16.3 X10^3/UL (ref 4.8–10.8)
NEUTROPHILS NFR BLD AUTO: 79.5 %
NRBC # BLD AUTO: 0 /100WBC
NRBC # BLD AUTO: 0 X10^3/UL
PDW BLD AUTO: 10.5 FL (ref 7.9–10.8)
PLATELET # BLD: 176 10^3/UL (ref 130–450)
POTASSIUM SERPL-SCNC: 3.8 MMOL/L (ref 3.5–5)
PROT SPEC-MCNC: 6.8 G/DL (ref 6.7–8.2)
RBC MAR: 3.65 10^6/UL (ref 4.2–5.4)
SODIUM SERPLBLD-SCNC: 136 MMOL/L (ref 135–145)

## 2022-07-27 PROCEDURE — 85025 COMPLETE CBC W/AUTO DIFF WBC: CPT

## 2022-07-27 PROCEDURE — 36415 COLL VENOUS BLD VENIPUNCTURE: CPT

## 2022-07-27 PROCEDURE — 82378 CARCINOEMBRYONIC ANTIGEN: CPT

## 2022-07-27 PROCEDURE — 80053 COMPREHEN METABOLIC PANEL: CPT

## 2022-07-27 PROCEDURE — 86301 IMMUNOASSAY TUMOR CA 19-9: CPT

## 2022-07-29 ENCOUNTER — HOSPITAL ENCOUNTER (EMERGENCY)
Dept: HOSPITAL 76 - ED | Age: 87
Discharge: HOME | End: 2022-07-29
Payer: MEDICARE

## 2022-07-29 VITALS — DIASTOLIC BLOOD PRESSURE: 78 MMHG | SYSTOLIC BLOOD PRESSURE: 148 MMHG

## 2022-07-29 DIAGNOSIS — K52.9: Primary | ICD-10-CM

## 2022-07-29 LAB
ALBUMIN DIAFP-MCNC: 3.7 G/DL (ref 3.2–5.5)
ALBUMIN/GLOB SERPL: 1 {RATIO} (ref 1–2.2)
ALP SERPL-CCNC: 49 IU/L (ref 42–121)
ALT SERPL W P-5'-P-CCNC: 21 IU/L (ref 10–60)
ANION GAP SERPL CALCULATED.4IONS-SCNC: 9 MMOL/L (ref 6–13)
AST SERPL W P-5'-P-CCNC: 27 IU/L (ref 10–42)
BASOPHILS NFR BLD AUTO: 0 10^3/UL (ref 0–0.1)
BASOPHILS NFR BLD AUTO: 0.4 %
BILIRUB BLD-MCNC: 0.7 MG/DL (ref 0.2–1)
BUN SERPL-MCNC: 8 MG/DL (ref 6–20)
CALCIUM UR-MCNC: 9.3 MG/DL (ref 8.5–10.3)
CHLORIDE SERPL-SCNC: 103 MMOL/L (ref 101–111)
CO2 SERPL-SCNC: 28 MMOL/L (ref 21–32)
CREAT SERPLBLD-SCNC: 0.8 MG/DL (ref 0.4–1)
EOSINOPHIL # BLD AUTO: 0.2 10^3/UL (ref 0–0.7)
EOSINOPHIL NFR BLD AUTO: 1.6 %
ERYTHROCYTE [DISTWIDTH] IN BLOOD BY AUTOMATED COUNT: 13.3 % (ref 12–15)
GFRSERPLBLD MDRD-ARVRAT: 68 ML/MIN/{1.73_M2} (ref 89–?)
GLOBULIN SER-MCNC: 3.8 G/DL (ref 2.1–4.2)
GLUCOSE SERPL-MCNC: 153 MG/DL (ref 70–100)
HCT VFR BLD AUTO: 38.6 % (ref 37–47)
HGB UR QL STRIP: 13.1 G/DL (ref 12–16)
INR PPP: 1 (ref 0.8–1.2)
LIPASE SERPL-CCNC: 48 U/L (ref 22–51)
LYMPHOCYTES # SPEC AUTO: 1.1 10^3/UL (ref 1.5–3.5)
LYMPHOCYTES NFR BLD AUTO: 11.8 %
MCH RBC QN AUTO: 34 PG (ref 27–31)
MCHC RBC AUTO-ENTMCNC: 33.9 G/DL (ref 32–36)
MCV RBC AUTO: 100.3 FL (ref 81–99)
MONOCYTES # BLD AUTO: 0.6 10^3/UL (ref 0–1)
MONOCYTES NFR BLD AUTO: 6.4 %
NEUTROPHILS # BLD AUTO: 7.3 10^3/UL (ref 1.5–6.6)
NEUTROPHILS # SNV AUTO: 9.2 X10^3/UL (ref 4.8–10.8)
NEUTROPHILS NFR BLD AUTO: 79.4 %
NRBC # BLD AUTO: 0 /100WBC
NRBC # BLD AUTO: 0 X10^3/UL
PDW BLD AUTO: 9.2 FL (ref 7.9–10.8)
PLATELET # BLD: 245 10^3/UL (ref 130–450)
POTASSIUM SERPL-SCNC: 4 MMOL/L (ref 3.5–5)
PROT SPEC-MCNC: 7.5 G/DL (ref 6.7–8.2)
PROTHROM ACT/NOR PPP: 11.2 SECS (ref 9.9–12.6)
RBC MAR: 3.85 10^6/UL (ref 4.2–5.4)
SODIUM SERPLBLD-SCNC: 140 MMOL/L (ref 135–145)

## 2022-07-29 PROCEDURE — 80053 COMPREHEN METABOLIC PANEL: CPT

## 2022-07-29 PROCEDURE — 83690 ASSAY OF LIPASE: CPT

## 2022-07-29 PROCEDURE — 82272 OCCULT BLD FECES 1-3 TESTS: CPT

## 2022-07-29 PROCEDURE — 86901 BLOOD TYPING SEROLOGIC RH(D): CPT

## 2022-07-29 PROCEDURE — 36415 COLL VENOUS BLD VENIPUNCTURE: CPT

## 2022-07-29 PROCEDURE — 85025 COMPLETE CBC W/AUTO DIFF WBC: CPT

## 2022-07-29 PROCEDURE — 99281 EMR DPT VST MAYX REQ PHY/QHP: CPT

## 2022-07-29 PROCEDURE — 99283 EMERGENCY DEPT VISIT LOW MDM: CPT

## 2022-07-29 PROCEDURE — 86850 RBC ANTIBODY SCREEN: CPT

## 2022-07-29 PROCEDURE — 85610 PROTHROMBIN TIME: CPT

## 2022-07-29 PROCEDURE — 86900 BLOOD TYPING SEROLOGIC ABO: CPT

## 2022-07-29 NOTE — ED PHYSICIAN DOCUMENTATION
PD HPI GI BLEED





- Stated complaint


Stated Complaint: BLACK STOOL





- Chief complaint


Chief Complaint: Abd Pain





- History obtained from


History obtained from: Patient





- History of Present Illness


Timing - onset: How many days ago (5-6)


Timing - duration: Days (5-6)


Timing - details: Gradual onset, Still present (She states less cramping and no 

blood in her stool for the last 2 days.  She has noticed dark green stool with 

question of black since yesterday.  Stool is soft without overt diarrhea.  

Cramps are still intermittent lower.)


Associated symptoms: Black/tarry stool (dark black to green.).  No: Fever, Near 

syncope / syncope


Contributing factors: Recent antibiotics, NSAID use.  No: Sick contact, Bad food


Recently seen: Emergency Dept (seen for red blood and lower abd cramps 5-6 days 

ago, with CT showing colitis and some mild blood per rectum. Was instructed to 

return if needed. Saw PCP 2 days ago and was feeling some improved and bleeding 

stopped. Today noted dark stools and concerned about bleeding. Still with lower 

cramps.)





Review of Systems


Constitutional: denies: Fever, Chills, Myalgias


Nose: denies: Rhinorrhea / runny nose, Congestion


Throat: denies: Sore throat


Respiratory: denies: Cough


GI: reports: Abdominal Pain, Nausea, Bloody / black stool.  denies: Vomiting, 

Diarrhea


Neurologic: reports: Generalized weakness.  denies: Focal weakness, Numbness, 

Near syncope





PD PAST MEDICAL HISTORY





- Past Medical History


Cardiovascular: Hypertension


Respiratory: None


Endocrine/Autoimmune: None


GI: None


: None


HEENT: Glaucoma, Chronic hearing loss


Psych: Depression


Musculoskeletal: Osteoarthritis


Derm: None





- Past Surgical History


Past Surgical History: Yes


General: Colonoscopy


HEENT: Cataracts, Other





- Present Medications


Home Medications: 


                                Ambulatory Orders











 Medication  Instructions  Recorded  Confirmed


 


Losartan [Cozaar] 50 mg PO DAILY 02/15/15 02/15/15


 


Raloxifene [Evista] 60 mg PO DAILY 02/15/15 01/31/17


 


Ondansetron Odt [Zofran Odt] 4 mg TL Q6H PRN #10 tablet 02/16/15 


 


Brinzolamide 1% Ophth Drops [Azopt 1 drops EACHEYE TID 01/31/17 01/31/17





1% Ophth Drops]   


 


Felodipine [Felodipine ER] 10 mg ORAL DAILY 01/31/17 01/31/17


 


HYDROcod/ACETAM 5/325 [Norco 5/325] 1 tab ORAL Q6HR PRN 01/31/17 01/31/17


 


Latanoprost 0.005% Ophth Drops 1 drops EACHEYE DAILY 01/31/17 01/31/17





[Xalatan Ophth Drops]   


 


Propylene Glycol/Peg 400 [Systane 1 drops EACHEYE TID 01/31/17 01/31/17





Ultra 0.4-0.3% Eye Drp]   


 


Amox/Clav 875/125 [Augmentin] 1 each PO Q12H #10 tablet 07/25/22 


 


HYDROcod/ACETAM 5/325 [Norco 5/325] 1 ea PO Q6H PRN #12 tablet 07/25/22 


 


Naproxen 250 mg PO BID 5 Days #10 tablet 07/25/22 


 


HYDROcod/ACETAM 5/325 [Norco 5/325] 1 ea PO Q6H PRN #15 tablet 07/29/22 


 


Saccharomyces Boulardii [Florastor] 500 mg PO BIDWM 5 Days #10 cap 07/29/22 














- Allergies


Allergies/Adverse Reactions: 


                                    Allergies











Allergy/AdvReac Type Severity Reaction Status Date / Time


 


No Known Drug Allergies Allergy   Verified 07/29/22 11:29














- Social History


Does the pt smoke?: No


Smoking Status: Never smoker





- Immunizations


Immunizations are current?: No





- POLST


Patient has POLST: No





PD ED PE NORMAL





- Vitals


Vital signs reviewed: Yes





- General


General: Alert and oriented X 3, No acute distress, Well developed/nourished





- Cardiac


Cardiac: RRR, No murmur





- Respiratory


Respiratory: Clear bilaterally





- Abdomen


Abdomen: Normal bowel sounds, Soft, Non distended, No organomegaly, Other (mild 

tenderness lower abd without guarding nor percussion tenderness. )





- Female 


Female : Deferred





- Rectal


Rectal: Deferred, Other (patient brought stool sample from home from today. It 

was dark green without look of melena. Guaiac testing negative. )





Results





- Vitals


Vitals: 


                               Vital Signs - 24 hr











  07/29/22 07/29/22





  11:23 13:16


 


Temperature 36.6 C 


 


Heart Rate 77 65


 


Respiratory 16 17





Rate  


 


Blood Pressure 164/83 H 148/78 H


 


O2 Saturation 100 100








                                     Oxygen











O2 Source                      Room air

















- Labs


Labs: 


                                  Microbiology











 07/29/22 12:41 Occult Blood - Final





 Stool - Loose Consistency 








                                Laboratory Tests











  07/29/22 07/29/22 07/29/22





  11:41 11:41 11:41


 


WBC   9.2 


 


RBC   3.85 L 


 


Hgb   13.1 


 


Hct   38.6 


 


MCV   100.3 H 


 


MCH   34.0 H 


 


MCHC   33.9 


 


RDW   13.3 


 


Plt Count   245 


 


MPV   9.2 


 


Neut # (Auto)   7.3 H 


 


Lymph # (Auto)   1.1 L 


 


Mono # (Auto)   0.6 


 


Eos # (Auto)   0.2 


 


Baso # (Auto)   0.0 


 


Absolute Nucleated RBC   0.00 


 


Nucleated RBC %   0.0 


 


PT    11.2


 


INR    1.0


 


Sodium   


 


Potassium   


 


Chloride   


 


Carbon Dioxide   


 


Anion Gap   


 


BUN   


 


Creatinine   


 


Estimated GFR (MDRD)   


 


Glucose   


 


Calcium   


 


Total Bilirubin   


 


AST   


 


ALT   


 


Alkaline Phosphatase   


 


Total Protein   


 


Albumin   


 


Globulin   


 


Albumin/Globulin Ratio   


 


Lipase   


 


Blood Type  O POSITIVE  


 


Antibody Screen  NEGATIVE  














  07/29/22





  11:41


 


WBC 


 


RBC 


 


Hgb 


 


Hct 


 


MCV 


 


MCH 


 


MCHC 


 


RDW 


 


Plt Count 


 


MPV 


 


Neut # (Auto) 


 


Lymph # (Auto) 


 


Mono # (Auto) 


 


Eos # (Auto) 


 


Baso # (Auto) 


 


Absolute Nucleated RBC 


 


Nucleated RBC % 


 


PT 


 


INR 


 


Sodium  140


 


Potassium  4.0


 


Chloride  103


 


Carbon Dioxide  28


 


Anion Gap  9.0


 


BUN  8


 


Creatinine  0.8


 


Estimated GFR (MDRD)  68 L


 


Glucose  153 H


 


Calcium  9.3


 


Total Bilirubin  0.7


 


AST  27


 


ALT  21


 


Alkaline Phosphatase  49


 


Total Protein  7.5


 


Albumin  3.7


 


Globulin  3.8


 


Albumin/Globulin Ratio  1.0


 


Lipase  48


 


Blood Type 


 


Antibody Screen 














PD MEDICAL DECISION MAKING





- ED course


Complexity details: reviewed results (H/H same as 4 days ago. Stool without me

ok by testing. ), considered differential, d/w patient





Departure





- Departure


Disposition: 01 Home, Self Care


Clinical Impression: 


 Bilateral lower abdominal cramping, Colitis





Condition: Stable


Record reviewed to determine appropriate education?: Yes


Prescriptions: 


Saccharomyces Boulardii [Florastor] 500 mg PO BIDWM 5 Days #10 cap


HYDROcod/ACETAM 5/325 [Norco 5/325] 1 ea PO Q6H PRN #15 tablet


 PRN Reason: Pain


Comments: 


Frequent fluids.  Continue usual medications.  Your stool is dark green but test

negative for blood. Your blood count is good and the same as the other day so no

notable blood loss.





I think your colitis sounds like it is improving if you are not having blood 

anymore.  It should improve even more over the next few more days.





I would have you finish the last day of the antibiotics for the colitis and add 

in probiotic Florastor twice daily for 5 days.





Hold off on the naproxen from previous.





Use Tylenol every 4-6 hours for pain or the hydrocodone/acetaminophen if needed 

for worse pain.  Try to reduce the amount of the hydrocodone to just 2 or 3 

times daily for the next few days and add in Tylenol instead.





Recheck if not fully improved over another few more days and return if 

worsening.





I sent your prescriptions to your pharmacy.


Discharge Date/Time: 07/29/22 13:17

## 2022-08-25 ENCOUNTER — HOSPITAL ENCOUNTER (OUTPATIENT)
Dept: HOSPITAL 76 - DI.N | Age: 87
Discharge: HOME | End: 2022-08-25
Attending: FAMILY MEDICINE
Payer: MEDICARE

## 2022-08-25 DIAGNOSIS — N17.9: ICD-10-CM

## 2022-08-25 DIAGNOSIS — K52.9: Primary | ICD-10-CM

## 2022-08-25 DIAGNOSIS — K92.2: ICD-10-CM

## 2022-08-25 LAB
ALBUMIN DIAFP-MCNC: 4 G/DL (ref 3.2–5.5)
ALBUMIN/GLOB SERPL: 1.1 {RATIO} (ref 1–2.2)
ALP SERPL-CCNC: 40 IU/L (ref 42–121)
ALT SERPL W P-5'-P-CCNC: 22 IU/L (ref 10–60)
ANION GAP SERPL CALCULATED.4IONS-SCNC: 5 MMOL/L (ref 6–13)
AST SERPL W P-5'-P-CCNC: 31 IU/L (ref 10–42)
BILIRUB BLD-MCNC: 0.7 MG/DL (ref 0.2–1)
BUN SERPL-MCNC: 10 MG/DL (ref 6–20)
CALCIUM UR-MCNC: 9.6 MG/DL (ref 8.5–10.3)
CHLORIDE SERPL-SCNC: 105 MMOL/L (ref 101–111)
CO2 SERPL-SCNC: 30 MMOL/L (ref 21–32)
CREAT SERPLBLD-SCNC: 0.6 MG/DL (ref 0.4–1)
CRP SERPL-MCNC: < 1 MG/DL (ref 0–1)
ERYTHROCYTE [DISTWIDTH] IN BLOOD BY AUTOMATED COUNT: 14.3 % (ref 12–15)
GFRSERPLBLD MDRD-ARVRAT: 94 ML/MIN/{1.73_M2} (ref 89–?)
GLOBULIN SER-MCNC: 3.5 G/DL (ref 2.1–4.2)
GLUCOSE SERPL-MCNC: 100 MG/DL (ref 70–100)
HCT VFR BLD AUTO: 39.3 % (ref 37–47)
HGB UR QL STRIP: 12.9 G/DL (ref 12–16)
MCH RBC QN AUTO: 33.7 PG (ref 27–31)
MCHC RBC AUTO-ENTMCNC: 32.8 G/DL (ref 32–36)
MCV RBC AUTO: 102.6 FL (ref 81–99)
NEUTROPHILS # SNV AUTO: 6 X10^3/UL (ref 4.8–10.8)
PDW BLD AUTO: 11 FL (ref 7.9–10.8)
PLATELET # BLD: 174 10^3/UL (ref 130–450)
POTASSIUM SERPL-SCNC: 4 MMOL/L (ref 3.5–5)
PROT SPEC-MCNC: 7.5 G/DL (ref 6.7–8.2)
RBC MAR: 3.83 10^6/UL (ref 4.2–5.4)
SODIUM SERPLBLD-SCNC: 140 MMOL/L (ref 135–145)

## 2022-08-25 PROCEDURE — 85651 RBC SED RATE NONAUTOMATED: CPT

## 2022-08-25 PROCEDURE — 85027 COMPLETE CBC AUTOMATED: CPT

## 2022-08-25 PROCEDURE — 86140 C-REACTIVE PROTEIN: CPT

## 2022-08-25 PROCEDURE — 80053 COMPREHEN METABOLIC PANEL: CPT

## 2022-08-25 PROCEDURE — 36415 COLL VENOUS BLD VENIPUNCTURE: CPT

## 2022-08-31 ENCOUNTER — HOSPITAL ENCOUNTER (OUTPATIENT)
Dept: HOSPITAL 76 - DI | Age: 87
Discharge: HOME | End: 2022-08-31
Attending: PHYSICIAN ASSISTANT
Payer: MEDICARE

## 2022-08-31 DIAGNOSIS — N17.9: Primary | ICD-10-CM

## 2022-08-31 DIAGNOSIS — K86.2: ICD-10-CM

## 2022-08-31 PROCEDURE — 74183 MRI ABD W/O CNTR FLWD CNTR: CPT

## 2022-08-31 NOTE — MRI REPORT
PROCEDURE:  Abdomen W/WO

 

INDICATIONS:  PANCREATIC LESION

 

CONTRAST:  IV CONTRAST: Gadavist ml: 4.8   

 

TECHNIQUE:  

Coronal ultra fast SE, axial 2D spoiled GE in- and out-of-phase; axial breath-hold T2 fast SE.  Dynam
ic axial ultra fast GE during the administration of contrast; post-contrast coronal ultra fast GE or 
2D spoiled GE with fat saturation from the hepatic dome to the iliac crests.  Optional diffusion weig
hted imaging and ADC may be performed.  

 

COMPARISON:  CT abdomen and pelvis 7/25/2022.

 

FINDINGS:  

Image quality:  Excellent.  

 

Lung bases:  No basal pleural effusions.  Heart size is normal.  

 

Solid organs:  Liver and spleen are normal in size and enhancement.  Gallbladder is unremarkable.  Bi
liary system is non dilated. There are 4 or 5 T2 hyperintense cysts in the pancreas, (10/5). Largest 
cyst appears bilobed in the body the pancreas. The largest component measures 1.1 cm, (11/7). No rest
ricted diffusion. No suspicious enhancement. No pancreatic ductal dilatation.  

 

No adrenal nodules.  Both kidneys demonstrate normal size and enhancement, without hydronephrosis. Sm
all T2 hyperintense renal cysts. 

 

Nodes and vessels:  No retroperitoneal or mesenteric adenopathy by size criteria.  Aorta and inferior
 vena cava are normal in size.  

 

Bowel and peritoneum: No dilated loops of bowel identified. No edema signal or diffusion in the left 
abdomen demonstrated. No free fluid.  

 

Bones and soft tissues:  No ventral hernias.  Bone marrow is normal in overall signal. Mild scoliosis
. 

 

IMPRESSION:  

1. Several small T2 hyperintense cysts in the pancreas. No suspicious imaging features demonstrated. 
Largest cyst measures approximately 1.1 cm in the body the pancreas.

-No further imaging follow-up is required for these incidental pancreatic cyst in this elderly patien
t.

 

2. There appears to be improvement of the suspected inflammatory change in the left colon seen on rec
ent CT.

 

Reviewed by: Madan Wiley MD on 8/31/2022 4:53 PM PDT

Approved by: Madan Wiley MD on 8/31/2022 4:53 PM PDT

 

 

Station ID:  [id number]

## 2022-12-06 ENCOUNTER — HOSPITAL ENCOUNTER (OUTPATIENT)
Dept: HOSPITAL 76 - LAB.N | Age: 87
Discharge: HOME | End: 2022-12-06
Attending: FAMILY MEDICINE
Payer: MEDICARE

## 2022-12-06 DIAGNOSIS — F43.21: ICD-10-CM

## 2022-12-06 DIAGNOSIS — R73.01: ICD-10-CM

## 2022-12-06 DIAGNOSIS — I10: Primary | ICD-10-CM

## 2022-12-06 DIAGNOSIS — M81.0: ICD-10-CM

## 2022-12-06 LAB
ALBUMIN DIAFP-MCNC: 3.8 G/DL (ref 3.2–5.5)
ALBUMIN/GLOB SERPL: 1.1 {RATIO} (ref 1–2.2)
ALP SERPL-CCNC: 43 IU/L (ref 42–121)
ALT SERPL W P-5'-P-CCNC: 27 IU/L (ref 10–60)
ANION GAP SERPL CALCULATED.4IONS-SCNC: 10 MMOL/L (ref 6–13)
AST SERPL W P-5'-P-CCNC: 36 IU/L (ref 10–42)
BASOPHILS NFR BLD AUTO: 0 10^3/UL (ref 0–0.1)
BASOPHILS NFR BLD AUTO: 0.7 %
BILIRUB BLD-MCNC: 0.7 MG/DL (ref 0.2–1)
BUN SERPL-MCNC: 11 MG/DL (ref 6–20)
CALCIUM UR-MCNC: 9.5 MG/DL (ref 8.5–10.3)
CHLORIDE SERPL-SCNC: 105 MMOL/L (ref 101–111)
CHOLEST SERPL-MCNC: 172 MG/DL
CO2 SERPL-SCNC: 27 MMOL/L (ref 21–32)
CREAT SERPLBLD-SCNC: 0.7 MG/DL (ref 0.4–1)
EOSINOPHIL # BLD AUTO: 0.1 10^3/UL (ref 0–0.7)
EOSINOPHIL NFR BLD AUTO: 1.8 %
ERYTHROCYTE [DISTWIDTH] IN BLOOD BY AUTOMATED COUNT: 14 % (ref 12–15)
EST. AVERAGE GLUCOSE BLD GHB EST-MCNC: 114 MG/DL (ref 70–100)
GFRSERPLBLD MDRD-ARVRAT: 79 ML/MIN/{1.73_M2} (ref 89–?)
GLOBULIN SER-MCNC: 3.6 G/DL (ref 2.1–4.2)
GLUCOSE SERPL-MCNC: 108 MG/DL (ref 70–100)
HBA1C MFR BLD HPLC: 5.6 % (ref 4.27–6.07)
HCT VFR BLD AUTO: 40.9 % (ref 37–47)
HDLC SERPL-MCNC: 79 MG/DL
HDLC SERPL: 2.2 {RATIO} (ref ?–4.4)
HGB UR QL STRIP: 13.5 G/DL (ref 12–16)
LDLC SERPL CALC-MCNC: 83 MG/DL
LDLC/HDLC SERPL: 1.1 {RATIO} (ref ?–4.4)
LYMPHOCYTES # SPEC AUTO: 1.7 10^3/UL (ref 1.5–3.5)
LYMPHOCYTES NFR BLD AUTO: 30.2 %
MCH RBC QN AUTO: 33.3 PG (ref 27–31)
MCHC RBC AUTO-ENTMCNC: 33 G/DL (ref 32–36)
MCV RBC AUTO: 101 FL (ref 81–99)
MONOCYTES # BLD AUTO: 0.5 10^3/UL (ref 0–1)
MONOCYTES NFR BLD AUTO: 9 %
NEUTROPHILS # BLD AUTO: 3.3 10^3/UL (ref 1.5–6.6)
NEUTROPHILS # SNV AUTO: 5.7 X10^3/UL (ref 4.8–10.8)
NEUTROPHILS NFR BLD AUTO: 58.1 %
NRBC # BLD AUTO: 0 /100WBC
NRBC # BLD AUTO: 0 X10^3/UL
PDW BLD AUTO: 10.4 FL (ref 7.9–10.8)
PLATELET # BLD: 186 10^3/UL (ref 130–450)
POTASSIUM SERPL-SCNC: 3.9 MMOL/L (ref 3.5–5)
PROT SPEC-MCNC: 7.4 G/DL (ref 6.7–8.2)
RBC MAR: 4.05 10^6/UL (ref 4.2–5.4)
SODIUM SERPLBLD-SCNC: 142 MMOL/L (ref 135–145)
TRIGL P FAST SERPL-MCNC: 48 MG/DL
TSH SERPL-ACNC: 3.31 UIU/ML (ref 0.34–5.6)
VLDLC SERPL-SCNC: 10 MG/DL

## 2022-12-06 PROCEDURE — 83721 ASSAY OF BLOOD LIPOPROTEIN: CPT

## 2022-12-06 PROCEDURE — 80061 LIPID PANEL: CPT

## 2022-12-06 PROCEDURE — 80053 COMPREHEN METABOLIC PANEL: CPT

## 2022-12-06 PROCEDURE — 84443 ASSAY THYROID STIM HORMONE: CPT

## 2022-12-06 PROCEDURE — 36415 COLL VENOUS BLD VENIPUNCTURE: CPT

## 2022-12-06 PROCEDURE — 83036 HEMOGLOBIN GLYCOSYLATED A1C: CPT

## 2022-12-06 PROCEDURE — 85025 COMPLETE CBC W/AUTO DIFF WBC: CPT
